# Patient Record
Sex: MALE | Race: BLACK OR AFRICAN AMERICAN | NOT HISPANIC OR LATINO | Employment: OTHER | RURAL
[De-identification: names, ages, dates, MRNs, and addresses within clinical notes are randomized per-mention and may not be internally consistent; named-entity substitution may affect disease eponyms.]

---

## 2022-03-21 ENCOUNTER — HOSPITAL ENCOUNTER (EMERGENCY)
Facility: HOSPITAL | Age: 37
Discharge: HOME | End: 2022-03-21
Attending: EMERGENCY MEDICINE
Payer: MEDICAID

## 2022-03-21 VITALS
OXYGEN SATURATION: 97 % | WEIGHT: 175 LBS | HEART RATE: 88 BPM | RESPIRATION RATE: 18 BRPM | HEIGHT: 76 IN | SYSTOLIC BLOOD PRESSURE: 129 MMHG | DIASTOLIC BLOOD PRESSURE: 93 MMHG | TEMPERATURE: 98 F | BODY MASS INDEX: 21.31 KG/M2

## 2022-03-21 DIAGNOSIS — T50.901A ACCIDENTAL DRUG OVERDOSE, INITIAL ENCOUNTER: Primary | ICD-10-CM

## 2022-03-21 PROCEDURE — 99282 EMERGENCY DEPT VISIT SF MDM: CPT | Mod: ,,, | Performed by: EMERGENCY MEDICINE

## 2022-03-21 PROCEDURE — 99283 EMERGENCY DEPT VISIT LOW MDM: CPT

## 2022-03-21 PROCEDURE — 99282 PR EMERGENCY DEPT VISIT,LEVEL II: ICD-10-PCS | Mod: ,,, | Performed by: EMERGENCY MEDICINE

## 2022-03-21 RX ORDER — SULFAMETHOXAZOLE AND TRIMETHOPRIM 800; 160 MG/1; MG/1
1 TABLET ORAL DAILY
COMMUNITY
Start: 2021-12-09

## 2022-03-21 RX ORDER — BICTEGRAVIR SODIUM, EMTRICITABINE, AND TENOFOVIR ALAFENAMIDE FUMARATE 50; 200; 25 MG/1; MG/1; MG/1
1 TABLET ORAL DAILY
COMMUNITY
Start: 2022-03-15

## 2022-03-21 NOTE — ED PROVIDER NOTES
Encounter Date: 3/21/2022    SCRIBE #1 NOTE: I, Elina Stevo, am scribing for, and in the presence of,  Darius Otoole MD. I have scribed the entire note.       History     Chief Complaint   Patient presents with    Drug Overdose     Patient is a 36 year old male who reports to the emergency department due to an accidentally drug overdose. Patient reports that he took his daily dose of Biktarvy 50/200/25 mg and 1 tab of Bactrim twice today (4 tabs from each bottle). Sister went to the patient's home and became concerned as the patient did not appear healthy. Sister is concerned the patient was attempting to commit suicide, patient denies this. Patient denies history of diabetes. Denies any fever or coughing. Patient reports being concerned and scared about the reactions that may occur.     The history is provided by the patient and a relative. No  was used.     Review of patient's allergies indicates:  No Known Allergies  Past Medical History:   Diagnosis Date    Human immunodeficiency virus (HIV) disease      History reviewed. No pertinent surgical history.  History reviewed. No pertinent family history.  Social History     Tobacco Use    Smoking status: Current Every Day Smoker     Types: Cigarettes    Smokeless tobacco: Never Used   Substance Use Topics    Alcohol use: Not Currently    Drug use: Yes     Types: Marijuana     Review of Systems   Constitutional: Negative.  Negative for fever.   HENT: Negative.    Eyes: Negative.    Respiratory: Negative.  Negative for cough.    Cardiovascular: Negative.    Gastrointestinal: Negative.    Endocrine: Negative.    Genitourinary: Negative.    Musculoskeletal: Negative.    Skin: Negative.    Allergic/Immunologic: Negative.    Neurological: Negative.    Hematological: Negative.    Psychiatric/Behavioral: The patient is nervous/anxious.    All other systems reviewed and are negative.      Physical Exam     Initial Vitals [03/21/22 1508]   BP  Pulse Resp Temp SpO2   122/83 95 18 97.8 °F (36.6 °C) 96 %      MAP       --         Physical Exam    Nursing note and vitals reviewed.  Constitutional: He appears well-developed and well-nourished.   HENT:   Head: Normocephalic and atraumatic.   Eyes: EOM are normal. Pupils are equal, round, and reactive to light.   Neck: Neck supple. No thyromegaly present.   Normal range of motion.  Cardiovascular: Normal rate, regular rhythm, normal heart sounds and intact distal pulses.   No murmur heard.  Pulmonary/Chest: Breath sounds normal. No respiratory distress. He has no wheezes.   Abdominal: Abdomen is soft. Bowel sounds are normal. He exhibits no distension. There is no abdominal tenderness.   Musculoskeletal:         General: No tenderness or edema. Normal range of motion.      Cervical back: Normal range of motion and neck supple.     Lymphadenopathy:     He has no cervical adenopathy.   Neurological: He is alert and oriented to person, place, and time. He has normal strength. No cranial nerve deficit or sensory deficit.   Skin: Skin is warm and dry. Capillary refill takes less than 2 seconds. No rash noted.   Psychiatric: His mood appears anxious.         ED Course   Procedures  Labs Reviewed - No data to display       Imaging Results    None          Medications - No data to display             Attending Attestation:           Physician Attestation for Scribe:  Physician Attestation Statement for Scribe #1: I, Xiang, reviewed documentation, as scribed by Stevo in my presence, and it is both accurate and complete.             ED Course as of 03/22/22 0619   Mon Mar 21, 2022   1616 Patient anxious and otherwise asymptomatic.  Accidentally took extra dose of his HIV medications and was very anxious and worried about that.  No other new symptoms.  Physical exam unremarkable.  Poison Control says that the dose was not toxic.  Family voiced understanding have patient return if symptoms worsen or new symptoms develop  [PK]      ED Course User Index  [PK] Darius Otoole MD             Clinical Impression:   Final diagnoses:  [T50.901A] Accidental drug overdose, initial encounter (Primary)          ED Disposition Condition    Discharge Stable        ED Prescriptions     None        Follow-up Information     Follow up With Specialties Details Why Contact [facility] - Emergency Department Emergency Medicine  If symptoms worsen [address] [place] [id number] [phone]           Darius Otoole MD  03/22/22 0619

## 2022-03-21 NOTE — ED TRIAGE NOTES
Pt states that he accidentally took extra home meds today. Denies trying to hurt himself. States that he forgets frequently if he has taken his medications or not & takes more. Pt states that he took a extra dose of his medications today. EMS states that family said that pt took 4 tabs out of each bottle. Home meds are Biktarvy 50/200/25 mg 1 tab daily & Bactrim DS 1 tab daily.

## 2024-12-23 ENCOUNTER — HOSPITAL ENCOUNTER (EMERGENCY)
Facility: HOSPITAL | Age: 39
Discharge: HOME | End: 2024-12-23
Payer: MEDICAID

## 2024-12-23 VITALS
BODY MASS INDEX: 19.12 KG/M2 | RESPIRATION RATE: 20 BRPM | HEIGHT: 76 IN | DIASTOLIC BLOOD PRESSURE: 83 MMHG | HEART RATE: 102 BPM | OXYGEN SATURATION: 99 % | TEMPERATURE: 97 F | WEIGHT: 157 LBS | SYSTOLIC BLOOD PRESSURE: 118 MMHG

## 2024-12-23 DIAGNOSIS — K59.00 CONSTIPATION, UNSPECIFIED CONSTIPATION TYPE: Primary | ICD-10-CM

## 2024-12-23 LAB
ALBUMIN SERPL BCP-MCNC: 4.1 G/DL (ref 3.5–5)
ALBUMIN/GLOB SERPL: 0.8 {RATIO}
ALP SERPL-CCNC: 79 U/L (ref 40–150)
ALT SERPL W P-5'-P-CCNC: 11 U/L
ANION GAP SERPL CALCULATED.3IONS-SCNC: 14 MMOL/L (ref 7–16)
AST SERPL W P-5'-P-CCNC: 39 U/L (ref 5–34)
BASOPHILS # BLD AUTO: 0.03 K/UL (ref 0–0.2)
BASOPHILS NFR BLD AUTO: 1.3 % (ref 0–1)
BILIRUB SERPL-MCNC: 0.5 MG/DL
BILIRUB UR QL STRIP: NEGATIVE
BUN SERPL-MCNC: 10 MG/DL (ref 9–21)
BUN/CREAT SERPL: 10 (ref 6–20)
CALCIUM SERPL-MCNC: 9.5 MG/DL (ref 8.4–10.2)
CHLORIDE SERPL-SCNC: 102 MMOL/L (ref 98–107)
CLARITY UR: CLEAR
CO2 SERPL-SCNC: 26 MMOL/L (ref 22–29)
COLOR UR: COLORLESS
CREAT SERPL-MCNC: 0.96 MG/DL (ref 0.72–1.25)
DIFFERENTIAL METHOD BLD: ABNORMAL
EGFR (NO RACE VARIABLE) (RUSH/TITUS): 102 ML/MIN/1.73M2
EOSINOPHIL # BLD AUTO: 0.16 K/UL (ref 0–0.5)
EOSINOPHIL NFR BLD AUTO: 7 % (ref 1–4)
EOSINOPHIL NFR BLD MANUAL: 7 % (ref 1–4)
ERYTHROCYTE [DISTWIDTH] IN BLOOD BY AUTOMATED COUNT: 11.7 % (ref 11.5–14.5)
GLOBULIN SER-MCNC: 5 G/DL (ref 2–4)
GLUCOSE SERPL-MCNC: 75 MG/DL (ref 74–100)
GLUCOSE UR STRIP-MCNC: NORMAL MG/DL
HCT VFR BLD AUTO: 44.3 % (ref 40–54)
HGB BLD-MCNC: 14.2 G/DL (ref 13.5–18)
IMM GRANULOCYTES # BLD AUTO: 0.01 K/UL (ref 0–0.04)
IMM GRANULOCYTES NFR BLD: 0.4 % (ref 0–0.4)
KETONES UR STRIP-SCNC: NEGATIVE MG/DL
LEUKOCYTE ESTERASE UR QL STRIP: NEGATIVE
LYMPHOCYTES # BLD AUTO: 0.62 K/UL (ref 1–4.8)
LYMPHOCYTES NFR BLD AUTO: 27.1 % (ref 27–41)
LYMPHOCYTES NFR BLD MANUAL: 26 % (ref 27–41)
MCH RBC QN AUTO: 30.7 PG (ref 27–31)
MCHC RBC AUTO-ENTMCNC: 32.1 G/DL (ref 32–36)
MCV RBC AUTO: 95.9 FL (ref 80–96)
MONOCYTES # BLD AUTO: 0.35 K/UL (ref 0–0.8)
MONOCYTES NFR BLD AUTO: 15.3 % (ref 2–6)
MONOCYTES NFR BLD MANUAL: 15 % (ref 2–6)
MPC BLD CALC-MCNC: 10.1 FL (ref 9.4–12.4)
NEUTROPHILS # BLD AUTO: 1.12 K/UL (ref 1.8–7.7)
NEUTROPHILS NFR BLD AUTO: 48.9 % (ref 53–65)
NEUTS BAND NFR BLD MANUAL: 2 % (ref 1–5)
NEUTS SEG NFR BLD MANUAL: 50 % (ref 50–62)
NITRITE UR QL STRIP: NEGATIVE
NRBC # BLD AUTO: 0 X10E3/UL
NRBC, AUTO (.00): 0 %
PH UR STRIP: 6 PH UNITS
PLATELET # BLD AUTO: 204 K/UL (ref 150–400)
PLATELET MORPHOLOGY: ABNORMAL
POTASSIUM SERPL-SCNC: 4.1 MMOL/L (ref 3.5–5.1)
PROT SERPL-MCNC: 9.1 G/DL (ref 6.4–8.3)
PROT UR QL STRIP: NEGATIVE
RBC # BLD AUTO: 4.62 M/UL (ref 4.6–6.2)
RBC # UR STRIP: NEGATIVE /UL
RBC MORPH BLD: NORMAL
SODIUM SERPL-SCNC: 138 MMOL/L (ref 136–145)
SP GR UR STRIP: 1
UROBILINOGEN UR STRIP-ACNC: NORMAL MG/DL
WBC # BLD AUTO: 2.29 K/UL (ref 4.5–11)

## 2024-12-23 PROCEDURE — 36415 COLL VENOUS BLD VENIPUNCTURE: CPT | Performed by: NURSE PRACTITIONER

## 2024-12-23 PROCEDURE — 99284 EMERGENCY DEPT VISIT MOD MDM: CPT | Mod: 25

## 2024-12-23 PROCEDURE — 85025 COMPLETE CBC W/AUTO DIFF WBC: CPT | Performed by: NURSE PRACTITIONER

## 2024-12-23 PROCEDURE — 80053 COMPREHEN METABOLIC PANEL: CPT | Performed by: NURSE PRACTITIONER

## 2024-12-23 PROCEDURE — 81003 URINALYSIS AUTO W/O SCOPE: CPT | Performed by: NURSE PRACTITIONER

## 2024-12-23 NOTE — DISCHARGE INSTRUCTIONS
Miralax 1 capful every day.  Drink plenty of water.  Get your nystatin (thrush medicine) filled & use as directed.  Follow-up with your primary care provider in a couple of days if no better.  Follow up with your HIV doctor as directed.  Return to the ER for new or worsening symptoms.

## 2025-07-22 ENCOUNTER — HOSPITAL ENCOUNTER (EMERGENCY)
Facility: HOSPITAL | Age: 40
Discharge: HOME | End: 2025-07-23
Attending: FAMILY MEDICINE
Payer: MEDICAID

## 2025-07-22 VITALS
HEART RATE: 72 BPM | RESPIRATION RATE: 16 BRPM | BODY MASS INDEX: 19.48 KG/M2 | HEIGHT: 76 IN | WEIGHT: 160 LBS | TEMPERATURE: 99 F | DIASTOLIC BLOOD PRESSURE: 75 MMHG | OXYGEN SATURATION: 100 % | SYSTOLIC BLOOD PRESSURE: 105 MMHG

## 2025-07-22 DIAGNOSIS — E86.0 DEHYDRATION: ICD-10-CM

## 2025-07-22 DIAGNOSIS — R56.9 SEIZURE: Primary | ICD-10-CM

## 2025-07-22 DIAGNOSIS — R62.7 FAILURE TO THRIVE IN ADULT: ICD-10-CM

## 2025-07-22 DIAGNOSIS — B37.0 OROPHARYNGEAL CANDIDIASIS: ICD-10-CM

## 2025-07-22 DIAGNOSIS — I63.81 LACUNAR INFARCTION: ICD-10-CM

## 2025-07-22 LAB
ALBUMIN SERPL BCP-MCNC: 3.4 G/DL (ref 3.5–5)
ALBUMIN/GLOB SERPL: 0.6 {RATIO}
ALP SERPL-CCNC: 62 U/L (ref 40–150)
ALT SERPL W P-5'-P-CCNC: 8 U/L
AMPHET UR QL SCN: NEGATIVE
AMYLASE SERPL-CCNC: 51 U/L (ref 25–125)
ANION GAP SERPL CALCULATED.3IONS-SCNC: 16 MMOL/L (ref 7–16)
AST SERPL W P-5'-P-CCNC: 39 U/L (ref 11–45)
BACTERIA #/AREA URNS HPF: ABNORMAL /HPF
BARBITURATES UR QL SCN: NEGATIVE
BASOPHILS # BLD AUTO: 0.01 K/UL (ref 0–0.2)
BASOPHILS NFR BLD AUTO: 0.3 % (ref 0–1)
BENZODIAZ METAB UR QL SCN: NEGATIVE
BILIRUB SERPL-MCNC: 1.3 MG/DL
BILIRUB UR QL STRIP: NEGATIVE
BUN SERPL-MCNC: 19 MG/DL (ref 9–21)
BUN/CREAT SERPL: 25 (ref 6–20)
CALCIUM SERPL-MCNC: 9.8 MG/DL (ref 8.4–10.2)
CANNABINOIDS UR QL SCN: POSITIVE
CHLORIDE SERPL-SCNC: 95 MMOL/L (ref 98–107)
CLARITY UR: CLEAR
CO2 SERPL-SCNC: 24 MMOL/L (ref 22–29)
COCAINE UR QL SCN: NEGATIVE
COLOR UR: YELLOW
CREAT SERPL-MCNC: 0.76 MG/DL (ref 0.72–1.25)
DIFFERENTIAL METHOD BLD: ABNORMAL
EGFR (NO RACE VARIABLE) (RUSH/TITUS): 115 ML/MIN/1.73M2
EOSINOPHIL # BLD AUTO: 0.01 K/UL (ref 0–0.5)
EOSINOPHIL NFR BLD AUTO: 0.3 % (ref 1–4)
ERYTHROCYTE [DISTWIDTH] IN BLOOD BY AUTOMATED COUNT: 15.2 % (ref 11.5–14.5)
GLOBULIN SER-MCNC: 5.4 G/DL (ref 2–4)
GLUCOSE SERPL-MCNC: 104 MG/DL (ref 74–100)
GLUCOSE UR STRIP-MCNC: NORMAL MG/DL
HCT VFR BLD AUTO: 38.2 % (ref 40–54)
HGB BLD-MCNC: 12.3 G/DL (ref 13.5–18)
IMM GRANULOCYTES # BLD AUTO: 0.05 K/UL (ref 0–0.04)
IMM GRANULOCYTES NFR BLD: 1.3 % (ref 0–0.4)
KETONES UR STRIP-SCNC: 40 MG/DL
LEUKOCYTE ESTERASE UR QL STRIP: NEGATIVE
LIPASE SERPL-CCNC: 22 U/L
LYMPHOCYTES # BLD AUTO: 0.26 K/UL (ref 1–4.8)
LYMPHOCYTES NFR BLD AUTO: 7 % (ref 27–41)
MCH RBC QN AUTO: 29.4 PG (ref 27–31)
MCHC RBC AUTO-ENTMCNC: 32.2 G/DL (ref 32–36)
MCV RBC AUTO: 91.4 FL (ref 80–96)
MONOCYTES # BLD AUTO: 0.3 K/UL (ref 0–0.8)
MONOCYTES NFR BLD AUTO: 8.1 % (ref 2–6)
MPC BLD CALC-MCNC: 8.6 FL (ref 9.4–12.4)
MUCOUS, UA: ABNORMAL /LPF
NEUTROPHILS # BLD AUTO: 3.09 K/UL (ref 1.8–7.7)
NEUTROPHILS NFR BLD AUTO: 83 % (ref 53–65)
NITRITE UR QL STRIP: NEGATIVE
NRBC # BLD AUTO: 0 X10E3/UL
NRBC, AUTO (.00): 0 %
OPIATES UR QL SCN: NEGATIVE
PCP UR QL SCN: NEGATIVE
PH UR STRIP: 6 PH UNITS
PLATELET # BLD AUTO: 298 K/UL (ref 150–400)
POTASSIUM SERPL-SCNC: 4.1 MMOL/L (ref 3.5–5.1)
PROT SERPL-MCNC: 8.8 G/DL (ref 6.4–8.3)
PROT UR QL STRIP: 30
RBC # BLD AUTO: 4.18 M/UL (ref 4.6–6.2)
RBC # UR STRIP: NEGATIVE /UL
RBC #/AREA URNS HPF: <1 /HPF
SODIUM SERPL-SCNC: 131 MMOL/L (ref 136–145)
SP GR UR STRIP: 1.03
UROBILINOGEN UR STRIP-ACNC: 8 MG/DL
WBC # BLD AUTO: 3.72 K/UL (ref 4.5–11)
WBC #/AREA URNS HPF: 1 /HPF

## 2025-07-22 PROCEDURE — 36415 COLL VENOUS BLD VENIPUNCTURE: CPT | Performed by: FAMILY MEDICINE

## 2025-07-22 PROCEDURE — 80053 COMPREHEN METABOLIC PANEL: CPT | Performed by: FAMILY MEDICINE

## 2025-07-22 PROCEDURE — 96360 HYDRATION IV INFUSION INIT: CPT

## 2025-07-22 PROCEDURE — 83690 ASSAY OF LIPASE: CPT | Performed by: FAMILY MEDICINE

## 2025-07-22 PROCEDURE — 82150 ASSAY OF AMYLASE: CPT | Performed by: FAMILY MEDICINE

## 2025-07-22 PROCEDURE — 63600175 PHARM REV CODE 636 W HCPCS: Performed by: EMERGENCY MEDICINE

## 2025-07-22 PROCEDURE — 85025 COMPLETE CBC W/AUTO DIFF WBC: CPT | Performed by: FAMILY MEDICINE

## 2025-07-22 PROCEDURE — 99285 EMERGENCY DEPT VISIT HI MDM: CPT | Mod: 25

## 2025-07-22 PROCEDURE — 80307 DRUG TEST PRSMV CHEM ANLYZR: CPT | Performed by: FAMILY MEDICINE

## 2025-07-22 PROCEDURE — 63700000 PHARM REV CODE 250 ALT 637 W/O HCPCS: Performed by: EMERGENCY MEDICINE

## 2025-07-22 PROCEDURE — 81001 URINALYSIS AUTO W/SCOPE: CPT | Performed by: FAMILY MEDICINE

## 2025-07-22 PROCEDURE — 25000003 PHARM REV CODE 250: Performed by: EMERGENCY MEDICINE

## 2025-07-22 RX ORDER — NYSTATIN 100000 [USP'U]/ML
500000 SUSPENSION ORAL
Qty: 200 ML | Refills: 0 | Status: SHIPPED | OUTPATIENT
Start: 2025-07-22 | End: 2025-08-01

## 2025-07-22 RX ORDER — NYSTATIN 100000 [USP'U]/ML
500000 SUSPENSION ORAL
Status: COMPLETED | OUTPATIENT
Start: 2025-07-22 | End: 2025-07-22

## 2025-07-22 RX ORDER — FLUCONAZOLE 100 MG/1
200 TABLET ORAL
Status: COMPLETED | OUTPATIENT
Start: 2025-07-22 | End: 2025-07-22

## 2025-07-22 RX ORDER — LACTOSE-REDUCED FOOD
30 LIQUID (ML) ORAL 2 TIMES DAILY
Qty: 300 ML | Refills: 0 | Status: SHIPPED | OUTPATIENT
Start: 2025-07-22 | End: 2025-07-25

## 2025-07-22 RX ADMIN — FLUCONAZOLE 200 MG: 100 TABLET ORAL at 08:07

## 2025-07-22 RX ADMIN — SODIUM CHLORIDE, POTASSIUM CHLORIDE, SODIUM LACTATE AND CALCIUM CHLORIDE 1000 ML: 600; 310; 30; 20 INJECTION, SOLUTION INTRAVENOUS at 08:07

## 2025-07-22 RX ADMIN — NYSTATIN 500000 UNITS: 100000 SUSPENSION ORAL at 08:07

## 2025-07-22 NOTE — ED PROVIDER NOTES
"Encounter Date: 7/22/2025    SCRIBE #1 NOTE: I, Zara Montesee, am scribing for, and in the presence of,  Milind Briggs DO.       History     Chief Complaint   Patient presents with    Fatigue     Pt reports being fatigued and reports he was taking a nap when he woke up and was not sure of where he was. He voiced not taking any of his medications "in a while." Pt is HIV +     This 40 y.o. male pt presents to the ED with c/o Fatigue. Pt is has Mhx of HIV and has not been able to eat. Pt states he has been wanting to eat but has not been able to eat. Pt is extremely skinny while here in the ED. There are no other issues to report with this pt at this time.     The history is provided by the patient. No  was used.     Review of patient's allergies indicates:  No Known Allergies  Past Medical History:   Diagnosis Date    Human immunodeficiency virus (HIV) disease      No past surgical history on file.  No family history on file.  Social History[1]  Review of Systems   Constitutional:  Positive for fatigue. Negative for activity change, chills, diaphoresis and fever.   HENT:  Negative for congestion, drooling, ear pain, rhinorrhea, sneezing, sore throat and trouble swallowing.    Eyes:  Negative for pain.   Respiratory:  Negative for cough, chest tightness, shortness of breath, wheezing and stridor.    Cardiovascular:  Negative for chest pain, palpitations and leg swelling.   Gastrointestinal:  Negative for abdominal distention, abdominal pain, constipation, diarrhea, nausea and vomiting.   Genitourinary:  Negative for difficulty urinating, dysuria, frequency and urgency.   Musculoskeletal:  Negative for arthralgias, back pain, myalgias, neck pain and neck stiffness.   Skin:  Negative for pallor, rash and wound.   Neurological:  Negative for dizziness, syncope, weakness, light-headedness, numbness and headaches.   All other systems reviewed and are negative.      Physical Exam     Initial Vitals " [07/22/25 1519]   BP Pulse Resp Temp SpO2   105/75 72 16 98.8 °F (37.1 °C) 100 %      MAP       --         Physical Exam    Constitutional: He appears lethargic.   Pt is extremely skinny and can see and count his ribs.      HENT:   Head: Normocephalic and atraumatic.   Right Ear: External ear normal.   Left Ear: External ear normal.   Nose: Nose normal. Mouth/Throat: Oropharynx is clear and moist.   Eyes: Conjunctivae and EOM are normal. Pupils are equal, round, and reactive to light.   Neck: Neck supple.   Normal range of motion.  Cardiovascular:  Normal rate, regular rhythm, normal heart sounds and intact distal pulses.           Pulmonary/Chest: Breath sounds normal.   Abdominal: Abdomen is soft. Bowel sounds are normal.   Genitourinary:    Prostate and penis normal.     Musculoskeletal:         General: Normal range of motion.      Cervical back: Normal range of motion and neck supple.     Neurological: He appears lethargic.   Skin: Skin is warm and dry.   Psychiatric: He has a normal mood and affect. His behavior is normal. Judgment and thought content normal.         ED Course   Procedures  Labs Reviewed   COMPREHENSIVE METABOLIC PANEL - Abnormal       Result Value    Sodium 131 (*)     Potassium 4.1      Chloride 95 (*)     CO2 24      Anion Gap 16      Glucose 104 (*)     BUN 19      Creatinine 0.76      BUN/Creatinine Ratio 25 (*)     Calcium 9.8      Total Protein 8.8 (*)     Albumin 3.4 (*)     Globulin 5.4 (*)     A/G Ratio 0.6      Bilirubin, Total 1.3      Alk Phos 62      ALT 8      AST 39      eGFR 115     URINALYSIS - Abnormal    Color, UA Yellow      Clarity, UA Clear      pH, UA 6.0      Leukocytes, UA Negative      Nitrites, UA Negative      Protein, UA 30 (*)     Glucose, UA Normal      Ketones, UA 40 (*)     Urobilinogen, UA 8 (*)     Bilirubin, UA Negative      Blood, UA Negative      Specific Gravity, UA 1.030     CBC WITH DIFFERENTIAL - Abnormal    WBC 3.72 (*)     RBC 4.18 (*)      Hemoglobin 12.3 (*)     Hematocrit 38.2 (*)     MCV 91.4      MCH 29.4      MCHC 32.2      RDW 15.2 (*)     Platelet Count 298      MPV 8.6 (*)     Neutrophils % 83.0 (*)     Lymphocytes % 7.0 (*)     Monocytes % 8.1 (*)     Eosinophils % 0.3 (*)     Basophils % 0.3      Immature Granulocytes % 1.3 (*)     nRBC, Auto 0.0      Neutrophils, Abs 3.09      Lymphocytes, Absolute 0.26 (*)     Monocytes, Absolute 0.30      Eosinophils, Absolute 0.01      Basophils, Absolute 0.01      Immature Granulocytes, Absolute 0.05 (*)     nRBC, Absolute 0.00      Diff Type Auto     DRUG SCREEN, URINE (BEAKER) - Abnormal    Barbiturates, Urine Negative      Benzodiazepine, Urine Negative      Opiates, Urine Negative      Phencyclidine, Urine Negative      Amphetamine, Urine Negative      Cannabinoid, Urine Positive (*)     Cocaine, Urine Negative      Narrative:     This screen includes the following classes of drugs at the listed cut-off:    Benzodiazepines 200 ng/ml  Cocaine metabolite 300 ng/ml  Opiates 2000 ng/ml  Barbiturates 200 ng/ml  Amphetamines 500 ng/ml  Marijuana metabs (THC) 50 ng/ml  Phencyclidine (PCP) 25 ng/ml    This is a screening test. If results do not correlate with clinical presentation, then a confirmatory send out test is advised.   URINALYSIS, MICROSCOPIC - Abnormal    WBC, UA 1      RBC, UA <1      Bacteria, UA Occasional (*)     Mucous Many (*)    AMYLASE - Normal    Amylase 51     LIPASE - Normal    Lipase 22     CBC W/ AUTO DIFFERENTIAL    Narrative:     The following orders were created for panel order CBC auto differential.  Procedure                               Abnormality         Status                     ---------                               -----------         ------                     CBC with Differential[[id number]]       Abnormal            Final result                 Please view results for these tests on the individual orders.          Imaging Results               CT Head Without  Contrast (Final result)  Result time 07/22/25 17:53:39      Final result by Rafael Willoughby MD (07/22/25 17:53:39)                   Impression:      Small hypodense area at the anterior margin of the left lateral basal ganglia could represent a small lacunar infarct age indeterminate, possibly recent. MRI follow-up may better characterize.    This report was flagged in Epic as abnormal.      Electronically signed by: Rafael Willoughby  Date:    07/22/2025  Time:    17:53               Narrative:    EXAMINATION:  CT HEAD WITHOUT CONTRAST    CLINICAL HISTORY:  Altered mental status, nontraumatic (Ped 0-18y);    TECHNIQUE:  Low dose axial CT images obtained throughout the head without intravenous contrast. Sagittal and coronal reconstructions were performed.    COMPARISON:  None.    FINDINGS:  Intracranial compartment:    Ventricles and sulci are normal in size for age without evidence of hydrocephalus. No extra-axial blood or fluid collections.    Small hypodense area at the anterior margin of the left lateral basal ganglia could represent a small lacunar infarct age indeterminate, possibly recent.  MRI follow-up may better characterize.    No parenchymal mass, hemorrhage, edema or major vascular distribution infarct.    Skull/extracranial contents (limited evaluation): No fracture. Mastoid air cells and paranasal sinuses are essentially clear.                                       X-Ray Chest PA And Lateral (Final result)  Result time 07/22/25 16:56:31      Final result by Ambar Tucker MD (07/22/25 16:56:31)                   Impression:      No acute abnormality.      Electronically signed by: Ambar Tucker MD  Date:    07/22/2025  Time:    16:56               Narrative:    EXAMINATION:  XR CHEST PA AND LATERAL    CLINICAL HISTORY:  Dehydration    TECHNIQUE:  PA and lateral views of the chest were performed.    COMPARISON:  None    FINDINGS:  The lungs are clear, with normal appearance of pulmonary  vasculature and no pleural effusion or pneumothorax.    The cardiac silhouette is normal in size. The hilar and mediastinal contours are unremarkable.    Bones are intact.                                       CT Abdomen Pelvis  Without Contrast (Final result)  Result time 07/22/25 16:42:34      Final result by Ambar Tucker MD (07/22/25 16:42:34)                   Impression:      There are no CT findings to specifically explain this patient's abdominal pain.  The study is limited by streak artifact from the patient's arms being positioned at their side.      Electronically signed by: Ambar Tucker MD  Date:    07/22/2025  Time:    16:42               Narrative:    EXAMINATION:  CT ABDOMEN PELVIS WITHOUT CONTRAST    CLINICAL HISTORY:  Abdominal pain, acute, nonlocalized;    TECHNIQUE:  Low dose axial images, sagittal and coronal reformations were obtained from the lung bases to the pubic symphysis.  30 mL of oral Omnipaque 350 was administered.    COMPARISON:  None    FINDINGS:  The liver, spleen, adrenal glands, kidneys and pancreas show an unremarkable non contrasted appearance.  The study is limited by streak artifact from the patient's arms being positioned at their side.  The gallbladder is in place.    There is no evidence bowel obstruction.  There is stool seen throughout the colon.  The appendix is not definitively seen however portions of the structure believed to represent the appendix within the right lower quadrant or within normal limits.    There is no evidence abdominal nor pelvic lymphadenopathy.  Reactive appearing lymph nodes are seen within both groins.  The osseous structures show likely bone islands within the left iliac bone..  There is no evidence abdominal fluid collection.                                       Medications   lactated ringers bolus 1,000 mL (0 mLs Intravenous Stopped 7/22/25 2200)   nystatin 100,000 unit/mL suspension 500,000 Units (500,000 Units Oral Given 7/22/25  2038)   fluconazole tablet 200 mg (200 mg Oral Given 7/22/25 2037)   levETIRAcetam tablet 1,000 mg (1,000 mg Oral Given 7/23/25 0018)     Medical Decision Making  Amount and/or Complexity of Data Reviewed  Labs: ordered.  Radiology: ordered.              Attending Attestation:           Physician Attestation for Scribe:  Physician Attestation Statement for Scribe #1: I, Milind Briggs, DO, reviewed documentation, as scribed by Zara Quezada in my presence, and it is both accurate and complete.             ED Course as of 07/23/25 1300   Wed Jul 23, 2025 0006 Patient had an episode of seizure activity prior to discharge.  I will give him a dose of Keppra start him on oral Keppra .  I will refer him to Neurology. [HK]      ED Course User Index  [HK] Zach Pink MD                Medical Decision Making:   Initial Assessment:   This 40 y.o. male pt presents to the ED with c/o Fatigue. Pt is has Mhx of HIV and has not been able to eat. Pt states he has been wanting to eat but has not been able to eat. Pt is extremely skinny while here in the ED. There are no other issues to report with this pt at this time.     The history is provided by the patient. No  was used.     Differential Diagnosis:   Seizure activity breakthrough seizure activity follow-up primary care                Clinical Impression:  Final diagnoses:  [E86.0] Dehydration  [B37.0] Oropharyngeal candidiasis  [I63.81] Lacunar infarction  [R62.7] Failure to thrive in adult  [R56.9] Seizure (Primary)          ED Disposition Condition    Discharge Stable          ED Prescriptions       Medication Sig Dispense Start Date End Date Auth. Provider    nystatin (MYCOSTATIN) 100,000 unit/mL suspension Take 5 mLs (500,000 Units total) by mouth 4 (four) times daily with meals and nightly. for 10 days 200 mL 7/22/2025 8/1/2025 Zach Pink MD    ENSURE ACTIVE LIGHT Liqd Take 30 mLs by mouth 2 (two) times daily. for 5 days 300 mL  7/22/2025 7/27/2025 Zach Pink MD    levETIRAcetam (KEPPRA) 500 MG Tab Take 1 tablet (500 mg total) by mouth 2 (two) times daily. 60 tablet 7/23/2025 7/23/2026 Zach Pink MD          Follow-up Information    None                [1]   Social History  Tobacco Use    Smoking status: Every Day     Types: Cigarettes    Smokeless tobacco: Never   Substance Use Topics    Alcohol use: Not Currently    Drug use: Yes     Types: Marijuana        Milind Briggs,   07/23/25 1300

## 2025-07-23 PROCEDURE — 25000003 PHARM REV CODE 250: Performed by: EMERGENCY MEDICINE

## 2025-07-23 RX ORDER — LEVETIRACETAM 500 MG/1
1000 TABLET ORAL ONCE
Status: COMPLETED | OUTPATIENT
Start: 2025-07-23 | End: 2025-07-23

## 2025-07-23 RX ORDER — LEVETIRACETAM 500 MG/1
500 TABLET ORAL 2 TIMES DAILY
Qty: 60 TABLET | Refills: 11 | Status: SHIPPED | OUTPATIENT
Start: 2025-07-23 | End: 2026-07-23

## 2025-07-23 RX ADMIN — LEVETIRACETAM 1000 MG: 500 TABLET, FILM COATED ORAL at 12:07

## 2025-07-23 NOTE — ED NOTES
Into room to d/c pt, pt not cooperative and unable to sit up. Dr. Pink called to room states it appears he had a seizure, pt back alert and oriented. MD orders placed and states since he is alert he is ok to be discharged.

## 2025-07-23 NOTE — ED NOTES
Pt d/c. Instructions reviewed with pt and family members. Denies any needs at this time. Pt ambulated to w/c and then to car with standby assist.

## 2025-07-25 ENCOUNTER — HOSPITAL ENCOUNTER (EMERGENCY)
Facility: HOSPITAL | Age: 40
Discharge: ANOTHER HOSPITAL | End: 2025-07-26
Attending: EMERGENCY MEDICINE
Payer: MEDICAID

## 2025-07-25 DIAGNOSIS — G40.909 SEIZURE DISORDER: ICD-10-CM

## 2025-07-25 DIAGNOSIS — B20 SYMPTOMATIC HIV INFECTION: ICD-10-CM

## 2025-07-25 DIAGNOSIS — R62.7 FAILURE TO THRIVE IN ADULT: ICD-10-CM

## 2025-07-25 DIAGNOSIS — E46 MALNUTRITION, UNSPECIFIED TYPE: ICD-10-CM

## 2025-07-25 DIAGNOSIS — R56.9 SEIZURE: Primary | ICD-10-CM

## 2025-07-25 DIAGNOSIS — H54.62 DECREASED VISION OF LEFT EYE: ICD-10-CM

## 2025-07-25 LAB
ALBUMIN SERPL BCP-MCNC: 3.2 G/DL (ref 3.5–5)
ALBUMIN/GLOB SERPL: 0.7 {RATIO}
ALP SERPL-CCNC: 64 U/L (ref 40–150)
ALT SERPL W P-5'-P-CCNC: 8 U/L
ANION GAP SERPL CALCULATED.3IONS-SCNC: 15 MMOL/L (ref 7–16)
AST SERPL W P-5'-P-CCNC: 13 U/L (ref 11–45)
BASOPHILS # BLD AUTO: 0.01 K/UL (ref 0–0.2)
BASOPHILS NFR BLD AUTO: 0.1 % (ref 0–1)
BILIRUB SERPL-MCNC: 0.9 MG/DL
BUN SERPL-MCNC: 26 MG/DL (ref 9–21)
BUN/CREAT SERPL: 28 (ref 6–20)
CALCIUM SERPL-MCNC: 8.7 MG/DL (ref 8.4–10.2)
CHLORIDE SERPL-SCNC: 96 MMOL/L (ref 98–107)
CO2 SERPL-SCNC: 26 MMOL/L (ref 22–29)
CREAT SERPL-MCNC: 0.94 MG/DL (ref 0.72–1.25)
DIFFERENTIAL METHOD BLD: ABNORMAL
EGFR (NO RACE VARIABLE) (RUSH/TITUS): 104 ML/MIN/1.73M2
EOSINOPHIL # BLD AUTO: 0.01 K/UL (ref 0–0.5)
EOSINOPHIL NFR BLD AUTO: 0.1 % (ref 1–4)
ERYTHROCYTE [DISTWIDTH] IN BLOOD BY AUTOMATED COUNT: 15.4 % (ref 11.5–14.5)
GLOBULIN SER-MCNC: 4.4 G/DL (ref 2–4)
GLUCOSE SERPL-MCNC: 101 MG/DL (ref 70–105)
GLUCOSE SERPL-MCNC: 114 MG/DL (ref 74–100)
HCT VFR BLD AUTO: 38 % (ref 40–54)
HGB BLD-MCNC: 12.3 G/DL (ref 13.5–18)
IMM GRANULOCYTES # BLD AUTO: 0.06 K/UL (ref 0–0.04)
IMM GRANULOCYTES NFR BLD: 0.9 % (ref 0–0.4)
LYMPHOCYTES # BLD AUTO: 0.22 K/UL (ref 1–4.8)
LYMPHOCYTES NFR BLD AUTO: 3.3 % (ref 27–41)
MAGNESIUM SERPL-MCNC: 2.4 MG/DL (ref 1.6–2.6)
MCH RBC QN AUTO: 29.6 PG (ref 27–31)
MCHC RBC AUTO-ENTMCNC: 32.4 G/DL (ref 32–36)
MCV RBC AUTO: 91.6 FL (ref 80–96)
MONOCYTES # BLD AUTO: 0.29 K/UL (ref 0–0.8)
MONOCYTES NFR BLD AUTO: 4.3 % (ref 2–6)
MPC BLD CALC-MCNC: 8.5 FL (ref 9.4–12.4)
NEUTROPHILS # BLD AUTO: 6.15 K/UL (ref 1.8–7.7)
NEUTROPHILS NFR BLD AUTO: 91.3 % (ref 53–65)
NRBC # BLD AUTO: 0 X10E3/UL
NRBC, AUTO (.00): 0 %
PLATELET # BLD AUTO: 270 K/UL (ref 150–400)
POTASSIUM SERPL-SCNC: 3.7 MMOL/L (ref 3.5–5.1)
PREALB SERPL NEPH-MCNC: 18 MG/DL (ref 18–45)
PROLACTIN SERPL-MCNC: 27.17 NG/ML (ref 3.46–19.4)
PROT SERPL-MCNC: 7.6 G/DL (ref 6.4–8.3)
RBC # BLD AUTO: 4.15 M/UL (ref 4.6–6.2)
SARS-COV-2 RDRP RESP QL NAA+PROBE: NEGATIVE
SODIUM SERPL-SCNC: 133 MMOL/L (ref 136–145)
WBC # BLD AUTO: 6.74 K/UL (ref 4.5–11)

## 2025-07-25 PROCEDURE — 87635 SARS-COV-2 COVID-19 AMP PRB: CPT | Performed by: EMERGENCY MEDICINE

## 2025-07-25 PROCEDURE — 63600175 PHARM REV CODE 636 W HCPCS: Performed by: EMERGENCY MEDICINE

## 2025-07-25 PROCEDURE — 84134 ASSAY OF PREALBUMIN: CPT | Performed by: EMERGENCY MEDICINE

## 2025-07-25 PROCEDURE — 36415 COLL VENOUS BLD VENIPUNCTURE: CPT | Performed by: EMERGENCY MEDICINE

## 2025-07-25 PROCEDURE — 85025 COMPLETE CBC W/AUTO DIFF WBC: CPT | Performed by: EMERGENCY MEDICINE

## 2025-07-25 PROCEDURE — 96374 THER/PROPH/DIAG INJ IV PUSH: CPT

## 2025-07-25 PROCEDURE — 99285 EMERGENCY DEPT VISIT HI MDM: CPT | Mod: 25

## 2025-07-25 PROCEDURE — 80053 COMPREHEN METABOLIC PANEL: CPT | Performed by: EMERGENCY MEDICINE

## 2025-07-25 PROCEDURE — 82962 GLUCOSE BLOOD TEST: CPT

## 2025-07-25 PROCEDURE — 84146 ASSAY OF PROLACTIN: CPT | Performed by: EMERGENCY MEDICINE

## 2025-07-25 PROCEDURE — 96361 HYDRATE IV INFUSION ADD-ON: CPT

## 2025-07-25 PROCEDURE — 83735 ASSAY OF MAGNESIUM: CPT | Performed by: EMERGENCY MEDICINE

## 2025-07-25 RX ORDER — LEVETIRACETAM 500 MG/5ML
1500 INJECTION, SOLUTION, CONCENTRATE INTRAVENOUS EVERY 12 HOURS
Status: DISCONTINUED | OUTPATIENT
Start: 2025-07-25 | End: 2025-07-26 | Stop reason: HOSPADM

## 2025-07-25 RX ORDER — LACTOSE-REDUCED FOOD
30 LIQUID (ML) ORAL 2 TIMES DAILY
Qty: 300 ML | Refills: 0 | Status: SHIPPED | OUTPATIENT
Start: 2025-07-25 | End: 2025-07-30

## 2025-07-25 RX ADMIN — SODIUM CHLORIDE, POTASSIUM CHLORIDE, SODIUM LACTATE AND CALCIUM CHLORIDE 1000 ML: 600; 310; 30; 20 INJECTION, SOLUTION INTRAVENOUS at 11:07

## 2025-07-25 RX ADMIN — SODIUM CHLORIDE, POTASSIUM CHLORIDE, SODIUM LACTATE AND CALCIUM CHLORIDE 1000 ML: 600; 310; 30; 20 INJECTION, SOLUTION INTRAVENOUS at 07:07

## 2025-07-25 RX ADMIN — LEVETIRACETAM 1500 MG: 100 INJECTION, SOLUTION INTRAVENOUS at 07:07

## 2025-07-25 NOTE — ED PROVIDER NOTES
Encounter Date: 7/25/2025    SCRIBE #1 NOTE: I, Dorcas Pringle, am scribing for, and in the presence of,  Zach Pink MD. I have scribed the entire note.       History     Chief Complaint   Patient presents with    Seizures    Fatigue    Generalized Body Aches     Pt presents to the ED with c/o seizure at 1200 today, niece reports pt's seizure lasting for 2 minutes. Pt also has fatigue for the past week and not wanting to anything, weakness for the past two days and weakness.     This is a 39 y/o male,who presents to the ED for further evaluation. His niece is in the room and tries to help with Hx, but is a poor historian. His niece states the pt is not getting any better. He has a known hx of HIV. According to previous records, the pt was at one point not taking his HIV medications. His niece is unsure if the pt has an HIV doctor. There is no hx of the pt being short of breath. There is no hx of a fever, but he has had a decreased PO intake. His niece stated to nursing staff, the pt had seizure like activity while at home which lasted for 2 minutes. There are no other complaints/pain in the ED at this time. He is a current every day smoker.     The history is provided by the patient and a relative.     Review of patient's allergies indicates:  No Known Allergies  Past Medical History:   Diagnosis Date    Human immunodeficiency virus (HIV) disease      History reviewed. No pertinent surgical history.  No family history on file.  Social History[1]  Review of Systems   Constitutional:  Positive for appetite change (Decreased PO intake.). Negative for fever.   Respiratory:  Negative for shortness of breath.    Neurological:  Positive for seizures.   All other systems reviewed and are negative.      Physical Exam     Initial Vitals [07/25/25 1812]   BP Pulse Resp Temp SpO2   103/67 (!) 111 20 98 °F (36.7 °C) 95 %      MAP       --         Physical Exam    Nursing note and vitals reviewed.  Constitutional:    Pt appears to be Cachectic   HENT:   Head: Normocephalic and atraumatic.   Eyes: EOM are normal. Pupils are equal, round, and reactive to light.   Neck: Neck supple. No thyromegaly present.   Normal range of motion.  Cardiovascular:  Regular rhythm, normal heart sounds and intact distal pulses.           No murmur heard.  Pt is tachycardiac.      Pulmonary/Chest: No respiratory distress. He has no wheezes.   Hard to say since the pt is not taking deep breaths.      Abdominal: Abdomen is soft. Bowel sounds are normal. There is no abdominal tenderness.   Musculoskeletal:         General: No tenderness or edema. Normal range of motion.      Cervical back: Normal range of motion and neck supple.     Lymphadenopathy:     He has no cervical adenopathy.   Neurological: He is alert and oriented to person, place, and time. He has normal strength and normal reflexes. No cranial nerve deficit or sensory deficit. GCS score is 15. GCS eye subscore is 4. GCS verbal subscore is 5. GCS motor subscore is 6.   Skin: Skin is warm and dry. Capillary refill takes less than 2 seconds. No rash noted.   Psychiatric: He has a normal mood and affect.         ED Course   Procedures  Labs Reviewed   COMPREHENSIVE METABOLIC PANEL - Abnormal       Result Value    Sodium 133 (*)     Potassium 3.7      Chloride 96 (*)     CO2 26      Anion Gap 15      Glucose 114 (*)     BUN 26 (*)     Creatinine 0.94      BUN/Creatinine Ratio 28 (*)     Calcium 8.7      Total Protein 7.6      Albumin 3.2 (*)     Globulin 4.4 (*)     A/G Ratio 0.7      Bilirubin, Total 0.9      Alk Phos 64      ALT 8      AST 13      eGFR 104     PROLACTIN - Abnormal    Prolactin, Blood 27.17 (*)    DRUG SCREEN, URINE (BEAKER) - Abnormal    Barbiturates, Urine Negative      Benzodiazepine, Urine Negative      Opiates, Urine Negative      Phencyclidine, Urine Negative      Amphetamine, Urine Negative      Cannabinoid, Urine Positive (*)     Cocaine, Urine Negative      Narrative:      This screen includes the following classes of drugs at the listed cut-off:    Benzodiazepines 200 ng/ml  Cocaine metabolite 300 ng/ml  Opiates 2000 ng/ml  Barbiturates 200 ng/ml  Amphetamines 500 ng/ml  Marijuana metabs (THC) 50 ng/ml  Phencyclidine (PCP) 25 ng/ml    This is a screening test. If results do not correlate with clinical presentation, then a confirmatory send out test is advised.   CBC WITH DIFFERENTIAL - Abnormal    WBC 6.74      RBC 4.15 (*)     Hemoglobin 12.3 (*)     Hematocrit 38.0 (*)     MCV 91.6      MCH 29.6      MCHC 32.4      RDW 15.4 (*)     Platelet Count 270      MPV 8.5 (*)     Neutrophils % 91.3 (*)     Lymphocytes % 3.3 (*)     Monocytes % 4.3      Eosinophils % 0.1 (*)     Basophils % 0.1      Immature Granulocytes % 0.9 (*)     nRBC, Auto 0.0      Neutrophils, Abs 6.15      Lymphocytes, Absolute 0.22 (*)     Monocytes, Absolute 0.29      Eosinophils, Absolute 0.01      Basophils, Absolute 0.01      Immature Granulocytes, Absolute 0.06 (*)     nRBC, Absolute 0.00      Diff Type Auto     URINALYSIS, REFLEX TO URINE CULTURE - Abnormal    Color, UA Yellow      Clarity, UA Clear      pH, UA 6.0      Leukocytes, UA Negative      Nitrites, UA Negative      Protein, UA 20 (*)     Glucose, UA Normal      Ketones, UA Negative      Urobilinogen, UA 6 (*)     Bilirubin, UA Negative      Blood, UA Negative      Specific Gravity, UA 1.027     MAGNESIUM - Normal    Magnesium 2.4     PREALBUMIN - Normal    Prealbumin 18     SARS-COV-2 RNA AMPLIFICATION, QUAL - Normal    SARS COV-2 Molecular Negative      Narrative:     Negative SARS-CoV results should not be used as the sole basis for treatment or patient management decisions; negative results should be considered in the context of a patient's recent exposures, history and the presene of clinical signs and symptoms consistent with COVID-19.  Negative results should be treated as presumptive and confirmed by molecular assay, if necessary for  patient management.   CBC W/ AUTO DIFFERENTIAL    Narrative:     The following orders were created for panel order CBC auto differential.  Procedure                               Abnormality         Status                     ---------                               -----------         ------                     CBC with Differential[[id number]]       Abnormal            Final result                 Please view results for these tests on the individual orders.   POCT GLUCOSE MONITORING CONTINUOUS    POC Glucose 101       EKG Readings: (Independently Interpreted)   Heart Rate: 99.   Interpreted by Dr. Joesph MD:   Sinus rhythm with aberrantly conducted supraventricular complexes  Q in V1,V2 may be due to lead placement error through septal infarct cannot be excluded.   Lateral T wave abnormality may be due to myocardial ischemia         Imaging Results              X-Ray Chest AP Portable (Final result)  Result time 07/25/25 19:35:30      Final result by Rafael Willoughby MD (07/25/25 19:35:30)                   Impression:      No acute abnormality.      Electronically signed by: Rafael Willoughby  Date:    07/25/2025  Time:    19:35               Narrative:    EXAMINATION:  XR CHEST AP PORTABLE    CLINICAL HISTORY:  Chest Pain;    TECHNIQUE:  Single frontal view of the chest was performed.    COMPARISON:  07/22/2025    FINDINGS:  The lungs are clear, with normal appearance of pulmonary vasculature and no pleural effusion or pneumothorax.    The cardiac silhouette is normal in size. The hilar and mediastinal contours are unremarkable.    Bones are intact.                                       Medications   lactated ringers bolus 1,000 mL (0 mLs Intravenous Stopped 7/25/25 2016)   lactated ringers bolus 1,000 mL (0 mLs Intravenous Stopped 7/26/25 0121)   lactated ringers bolus 1,000 mL (0 mLs Intravenous Stopped 7/26/25 0049)   sodium chloride 0.9% bolus 1,000 mL 1,000 mL (0 mLs Intravenous Stopped 7/26/25 1315)      Medical Decision Making  This is a 41 y/o male,who presents to the ED for further evaluation. His niece is in the room and tries to help with Hx, but is a poor historian. His niece states the pt is not getting any better. He has a known hx of HIV. According to previous records, the pt was at one point not taking his HIV medications. His niece is unsure if the pt has an HIV doctor. There is no hx of the pt being short of breath. There is no hx of a fever, but he has had a decreased PO intake. His niece stated to nursing staff, the pt had seizure like activity while at home which lasted for 2 minutes. There are no other complaints/pain in the ED at this time. He is a current every day smoker.   Ddx: new onset seizure +/- failure to thrive vs aids vs other  Transfer to higher level of care      Amount and/or Complexity of Data Reviewed  Independent Historian:      Details: We spoke with the pt and his niece.     Labs: ordered.  Radiology: ordered.  Discussion of management or test interpretation with external provider(s): Discussed with ED attending at [facility], will accept    Risk  OTC drugs.  Prescription drug management.  Decision regarding hospitalization.              Attending Attestation:           Physician Attestation for Scribe:  Physician Attestation Statement for Scribe #1: I, Zach Pink MD, reviewed documentation, as scribed by Dorcas Pringle in my presence, and it is both accurate and complete.             ED Course as of 08/02/25 1348   Fri Jul 25, 2025 2010 Xray Chest AP Portable:   No acute abnormality. [BW]   Sat Jul 26, 2025   0608 Care transferred to Dr. Obrien [HK]      ED Course User Index  [BW] Dorcas Pringle  [HK] Zach Pink MD                               Clinical Impression:  Final diagnoses:  [R56.9] Seizure (Primary)  [E46] Malnutrition, unspecified type          ED Disposition Condition    Transfer to Another Facility Stable                      [1]    Social History  Tobacco Use    Smoking status: Every Day     Types: Cigarettes    Smokeless tobacco: Never   Substance Use Topics    Alcohol use: Not Currently    Drug use: Yes     Types: Marijuana        Kostas Obrien MD  08/02/25 7129

## 2025-07-26 VITALS
SYSTOLIC BLOOD PRESSURE: 113 MMHG | OXYGEN SATURATION: 96 % | DIASTOLIC BLOOD PRESSURE: 80 MMHG | RESPIRATION RATE: 18 BRPM | TEMPERATURE: 98 F | HEART RATE: 66 BPM | BODY MASS INDEX: 21.31 KG/M2 | HEIGHT: 76 IN | WEIGHT: 175 LBS

## 2025-07-26 PROBLEM — Z21 HIV (HUMAN IMMUNODEFICIENCY VIRUS INFECTION): Status: ACTIVE | Noted: 2025-07-26

## 2025-07-26 PROBLEM — F12.20 MARIJUANA DEPENDENCE: Status: ACTIVE | Noted: 2025-07-26

## 2025-07-26 PROBLEM — R62.7 FAILURE TO THRIVE IN ADULT: Status: ACTIVE | Noted: 2025-07-26

## 2025-07-26 PROBLEM — Z91.199 NONCOMPLIANCE: Status: ACTIVE | Noted: 2025-07-26

## 2025-07-26 PROBLEM — F29 UNSPECIFIED PSYCHOSIS NOT DUE TO A SUBSTANCE OR KNOWN PHYSIOLOGICAL CONDITION: Status: ACTIVE | Noted: 2025-07-26

## 2025-07-26 PROBLEM — H54.62 DECREASED VISION OF LEFT EYE: Status: ACTIVE | Noted: 2025-07-26

## 2025-07-26 PROBLEM — G40.909 SEIZURE DISORDER: Status: ACTIVE | Noted: 2025-07-26

## 2025-07-26 LAB
AMPHET UR QL SCN: NEGATIVE
BARBITURATES UR QL SCN: NEGATIVE
BENZODIAZ METAB UR QL SCN: NEGATIVE
BILIRUB UR QL STRIP: NEGATIVE
CANNABINOIDS UR QL SCN: POSITIVE
CLARITY UR: CLEAR
COCAINE UR QL SCN: NEGATIVE
COLOR UR: YELLOW
GLUCOSE UR STRIP-MCNC: NORMAL MG/DL
KETONES UR STRIP-SCNC: NEGATIVE MG/DL
LEUKOCYTE ESTERASE UR QL STRIP: NEGATIVE
NITRITE UR QL STRIP: NEGATIVE
OPIATES UR QL SCN: NEGATIVE
PCP UR QL SCN: NEGATIVE
PH UR STRIP: 6 PH UNITS
PROT UR QL STRIP: 20
RBC # UR STRIP: NEGATIVE /UL
SP GR UR STRIP: 1.03
UROBILINOGEN UR STRIP-ACNC: 6 MG/DL

## 2025-07-26 PROCEDURE — 80307 DRUG TEST PRSMV CHEM ANLYZR: CPT | Performed by: EMERGENCY MEDICINE

## 2025-07-26 PROCEDURE — 96361 HYDRATE IV INFUSION ADD-ON: CPT

## 2025-07-26 PROCEDURE — 63600175 PHARM REV CODE 636 W HCPCS: Performed by: EMERGENCY MEDICINE

## 2025-07-26 PROCEDURE — 25000003 PHARM REV CODE 250: Mod: UD | Performed by: EMERGENCY MEDICINE

## 2025-07-26 PROCEDURE — 81003 URINALYSIS AUTO W/O SCOPE: CPT | Performed by: EMERGENCY MEDICINE

## 2025-07-26 PROCEDURE — 96374 THER/PROPH/DIAG INJ IV PUSH: CPT | Mod: 59

## 2025-07-26 PROCEDURE — 99205 OFFICE O/P NEW HI 60 MIN: CPT | Mod: ,,, | Performed by: HOSPITALIST

## 2025-07-26 RX ADMIN — SODIUM CHLORIDE, POTASSIUM CHLORIDE, SODIUM LACTATE AND CALCIUM CHLORIDE 1000 ML: 600; 310; 30; 20 INJECTION, SOLUTION INTRAVENOUS at 12:07

## 2025-07-26 RX ADMIN — SODIUM CHLORIDE 1000 ML: 9 INJECTION, SOLUTION INTRAVENOUS at 12:07

## 2025-07-26 RX ADMIN — LEVETIRACETAM 1500 MG: 100 INJECTION, SOLUTION INTRAVENOUS at 10:07

## 2025-07-26 NOTE — ASSESSMENT & PLAN NOTE
Smokes marijuana but states it has not used in over 2 weeks  Says he does this to help with appetite.  Denies any nausea and vomiting but told him that marijuana could be working in the opposite on him.

## 2025-07-26 NOTE — ED NOTES
"Cathed pt for urine sample and had no urine return. Pt does not recall events that happened around discharge. States "I fell asleep."   "

## 2025-07-26 NOTE — ASSESSMENT & PLAN NOTE
"  States last hallucinations was a proximally 3 weeks earlier.  Describes them as seeing.  States a lot of people he sees or people that he knew that have since .  States sometimes they talk to him.  When asked if they were scary he states no they are "nice".  When seen at [facility] in  he was then noted to have suicidal thoughts.  States he is no longer suicidal and has no plans or thoughts of harming himself.  States he has safe in this regard.   "

## 2025-07-26 NOTE — ED NOTES
Attempted to discharge pt after pt made several statements regarding wanting to go home and asking how much longer. Family member at  laughing at everything pt says and does. Pt unable to stand on his own to transfer to wheelchair and then car. Pt not even holding head up. Pt out of wheelchair to car and slid down to ground and would not even attempt to get into car. Family states to take him back inside that they will just bring him right back up if he goes home. Pt back to room and required 3 nurses to transfer back to bed. Joesph [employer] made aware.

## 2025-07-26 NOTE — CONSULTS
"[facility] - Emergency Department  Hospital Medicine  Consult Note    Patient Name: Gabino Mccall  MRN: [id number]  Admission Date: 7/25/2025  Hospital Length of Stay: 0 days  Attending Physician: Kostas Obrien MD   Primary Care Provider: Dianne, Primary Doctor           Patient information was obtained from patient, past medical records, and ER records.     Consults  Subjective:     Principal Problem: <principal problem not specified>    Chief Complaint:   Chief Complaint   Patient presents with    Seizures    Fatigue    Generalized Body Aches     Pt presents to the ED with c/o seizure at 1200 today, niece reports pt's seizure lasting for 2 minutes. Pt also has fatigue for the past week and not wanting to anything, weakness for the past two days and weakness.        HPI:   Thank you for consult    Chief complaint:  Can not eat    History of present:        Mr. Mccall is a 40-year-old admitted prior day to the emergency room after having witnessed seizure at home lasting approximately 2 minutes.  Patient states he must have passed out and does not remember.  States he has had several falls reasons in told that they were seizure.  He is not aware of circumstances in that he states he just "falls out".  States he has had urinary incontinence with these episodes but no tongue biting.  Gets occasionally headaches but not severe and not bothering him recently.  No fever or other recent illness.  He had been in the emergency department on 07/22 after his family found him unresponsive and had hard time waking him up.  He had told providers at that time he must have taken a nap.  Patient does not remember details of that episode currently.  Patient has been taking Keppra.  He does not remember when that was started or circumstance.  Feels these episodes of falling out that he has been told were seizure started this year.  Has not seen anyone outside the emergency department concerning these.  Patient was being " "evaluated here with decision to discharge him home with outpatient follow up.  Going to the car had a near syncopal episode and was brought back into the emergency department.  He states that episode was somewhat different although he can not remember the whole episode.  Nursing staff did not witness any seizure with that episode nor any postictal period.       Patient  has had several months of "not been able to eat".  Was treated in December 2024 4 thrush and given nystatin.  This time denies any sore mouth or dysphagia.  That is just does not have any appetite.  He feels he has lost weight but not sure how much.  Tells me he weighed 175 lb 3 months ago in that is his current listed weigth ( will have confirmed).  No abdominal pain.  No GI upset.  Last bowel movement prior day and but he states he is not eating enough to have bowel movements.       Patient has history of some point over 3 years of HIV.  He had been followed at [facility].  States proximally year ago he stopped all those medicines and quit gone.  There has numerous listed call in records of appointments made and patient not showing up.   is currently taking 2 home medicines.  Only thing confirmed was nystatin swish and swallow and Keppra 500 mg twice daily.  He has been off Septra.  Had listed Biktarvy  -25 daily but could not be confirm that he had had filled plus patient told me he stopped all his HIV drugs more than a year ago.         Hospitalist service was asked to evaluate patient medically.    Review of systems:  See above.  Patient's poor historian and difficult to get specifics from him.  Has some thick speech that is also difficult to understand.  Occasional headaches but not recent.  States in the last week has had Mik decreased vision to left eye.  No diplopia.  No prior similar problems.  Last bowel movement prior day that was small and hard.  States urination has been good.  As stated denies sore mouth or " dysphagia that would suggest active yeast infection.  States can ambulate just weak.  History of hallucinations.  States last hallucinations proximally 3 weeks earlier.  Describes them has visual hallucinations generally but can hear people talking at times.  States has been people that he knows had  that he can see.  States it is not scary.  Denies any suicidal thoughts or plans currently.  Review of systems otherwise negative.    Past medical history:  -HIV diagnosed more than 3 years ago  -unspecified psychosis  -seizure disorder    Past surgical history  None    No known medication allergies    Social history:  Lives with sister  States it has not been sexually active in more than 1 year  Had some homosexual exposure he states 5 or 6 years ago  No history of tobacco or alcohol use  States only uses marijuana because he feels it help him with his appetite  Last marijuana he states he has been over 2 weeks prior to admission    Family history:  No one in family premature coronary artery disease    Health maintenance issues:  No primary care provider, states it has not seen doctor in over a year accept through the emergency department          Past Medical History:   Diagnosis Date    Human immunodeficiency virus (HIV) disease        History reviewed. No pertinent surgical history.    Review of patient's allergies indicates:  No Known Allergies    No current facility-administered medications on file prior to encounter.     Current Outpatient Medications on File Prior to Encounter   Medication Sig    levETIRAcetam (KEPPRA) 500 MG Tab Take 1 tablet (500 mg total) by mouth 2 (two) times daily.    nystatin (MYCOSTATIN) 100,000 unit/mL suspension Take 5 mLs (500,000 Units total) by mouth 4 (four) times daily with meals and nightly. for 10 days    BIKTARVY -25 mg (25 kg or greater) Take 1 tablet by mouth once daily.    [DISCONTINUED] sulfamethoxazole-trimethoprim 800-160mg (BACTRIM DS) 800-160 mg Tab Take 1  tablet by mouth once daily.     Family History    None       Tobacco Use    Smoking status: Every Day     Types: Cigarettes    Smokeless tobacco: Never   Substance and Sexual Activity    Alcohol use: Not Currently    Drug use: Yes     Types: Marijuana    Sexual activity: Not on file     Review of Systems   Constitutional:  Positive for activity change, appetite change and fatigue. Negative for fever.   HENT:  Negative for congestion, hearing loss and trouble swallowing.    Eyes:  Positive for visual disturbance.   Respiratory:  Positive for cough. Negative for chest tightness, shortness of breath and wheezing.    Cardiovascular:  Negative for chest pain and palpitations.   Gastrointestinal:  Positive for constipation. Negative for abdominal pain and nausea.   Genitourinary:  Negative for difficulty urinating and dysuria.   Musculoskeletal:  Negative for back pain and neck stiffness.   Skin:  Negative for pallor and rash.   Neurological:  Negative for dizziness, speech difficulty and headaches.   Psychiatric/Behavioral:  Positive for hallucinations. Negative for confusion and suicidal ideas.      Objective:     Vital Signs (Most Recent):  Temp: 97.6 °F (36.4 °C) (07/26/25 0705)  Pulse: 66 (07/26/25 1645)  Resp: 18 (07/26/25 0705)  BP: (!) 136/99 (07/26/25 1645)  SpO2: 96 % (07/26/25 1645) Vital Signs (24h Range):  Temp:  [97.6 °F (36.4 °C)-98 °F (36.7 °C)] 97.6 °F (36.4 °C)  Pulse:  [] 66  Resp:  [15-20] 18  SpO2:  [91 %-100 %] 96 %  BP: ()/(64-99) 136/99     Weight: 79.4 kg (175 lb)  Body mass index is 21.3 kg/m².     Physical Exam  Vitals reviewed.   Constitutional:       General: He is awake. He is not in acute distress.     Appearance: He is well-developed. He is not toxic-appearing.      Comments: Very thin   HENT:      Head: Normocephalic.      Nose: Nose normal.      Mouth/Throat:      Mouth: No oral lesions.      Pharynx: Oropharynx is clear.      Comments: No clear thrush  Eyes:      Extraocular  Movements: Extraocular movements intact.      Right eye: Normal extraocular motion.      Left eye: Normal extraocular motion.      Pupils: Pupils are equal, round, and reactive to light.      Comments: Pupils 2 mm bilaterally and reactive, pupil seem slightly constricted bilaterally    Decreased vision in left eye without field cut, just states blurry vision in left eye but can counts fingers with that eye   Neck:      Thyroid: No thyroid mass.      Vascular: No carotid bruit.   Cardiovascular:      Rate and Rhythm: Normal rate and regular rhythm.      Pulses: Normal pulses.      Heart sounds: Normal heart sounds. No murmur heard.  Pulmonary:      Effort: Pulmonary effort is normal.      Breath sounds: Normal breath sounds and air entry. No wheezing.   Abdominal:      General: Bowel sounds are normal. There is no distension.      Palpations: Abdomen is soft.      Tenderness: There is no abdominal tenderness.   Musculoskeletal:         General: Normal range of motion.      Cervical back: Neck supple. No rigidity.   Skin:     General: Skin is warm.      Coloration: Skin is not jaundiced.      Findings: No lesion.   Neurological:      General: No focal deficit present.      Mental Status: He is alert and oriented to person, place, and time.      Cranial Nerves: No cranial nerve deficit.   Psychiatric:         Attention and Perception: Attention normal.         Mood and Affect: Mood normal.         Behavior: Behavior normal. Behavior is cooperative.         Thought Content: Thought content normal.         Cognition and Memory: Cognition normal.          Significant Labs: All pertinent labs within the past 24 hours have been reviewed.  BMP:   Recent Labs   Lab 07/25/25 1922   *   *   K 3.7   CL 96*   CO2 26   BUN 26*   CREATININE 0.94   CALCIUM 8.7   MG 2.4     CBC:   Recent Labs   Lab 07/25/25 1922   WBC 6.74   HGB 12.3*   HCT 38.0*        CMP:   Recent Labs   Lab 07/25/25 1922   *   K 3.7    CL 96*   CO2 26   *   BUN 26*   CREATININE 0.94   CALCIUM 8.7   PROT 7.6   ALBUMIN 3.2*   BILITOT 0.9   ALKPHOS 64   AST 13   ALT 8   ANIONGAP 15       Imaging Results              X-Ray Chest AP Portable (Final result)  Result time 07/25/25 19:35:30      Final result by Rafael Willoughby MD (07/25/25 19:35:30)                   Impression:      No acute abnormality.      Electronically signed by: Rafael Willoughby  Date:    07/25/2025  Time:    19:35               Narrative:    EXAMINATION:  XR CHEST AP PORTABLE    CLINICAL HISTORY:  Chest Pain;    TECHNIQUE:  Single frontal view of the chest was performed.    COMPARISON:  07/22/2025    FINDINGS:  The lungs are clear, with normal appearance of pulmonary vasculature and no pleural effusion or pneumothorax.    The cardiac silhouette is normal in size. The hilar and mediastinal contours are unremarkable.    Bones are intact.                                    Intake/Output - Last 3 Shifts      CT HEAD WITHOUT CONTRAST     CLINICAL HISTORY:  Altered mental status, nontraumatic (Ped 0-18y);     TECHNIQUE:  Low dose axial CT images obtained throughout the head without intravenous contrast. Sagittal and coronal reconstructions were performed.     COMPARISON:  None.     FINDINGS:  Intracranial compartment:     Ventricles and sulci are normal in size for age without evidence of hydrocephalus. No extra-axial blood or fluid collections.     Small hypodense area at the anterior margin of the left lateral basal ganglia could represent a small lacunar infarct age indeterminate, possibly recent.  MRI follow-up may better characterize.     No parenchymal mass, hemorrhage, edema or major vascular distribution infarct.     Skull/extracranial contents (limited evaluation): No fracture. Mastoid air cells and paranasal sinuses are essentially clear.     Impression:     Small hypodense area at the anterior margin of the left lateral basal ganglia could represent a small lacunar  infarct age indeterminate, possibly recent. MRI follow-up may better characterize.     This report was flagged in Epic as abnormal.        Electronically signed by:Rafael Castro  Date:                                            07/22/2025  Time:                                           17:53        Exam Ended: 07/22/25 17:17 CDT Last Resulted: 07/22/25 17:53 CDT         CT ABDOMEN PELVIS WITHOUT CONTRAST     CLINICAL HISTORY:  Abdominal pain, acute, nonlocalized;     TECHNIQUE:  Low dose axial images, sagittal and coronal reformations were obtained from the lung bases to the pubic symphysis.  30 mL of oral Omnipaque 350 was administered.     COMPARISON:  None     FINDINGS:  The liver, spleen, adrenal glands, kidneys and pancreas show an unremarkable non contrasted appearance.  The study is limited by streak artifact from the patient's arms being positioned at their side.  The gallbladder is in place.     There is no evidence bowel obstruction.  There is stool seen throughout the colon.  The appendix is not definitively seen however portions of the structure believed to represent the appendix within the right lower quadrant or within normal limits.     There is no evidence abdominal nor pelvic lymphadenopathy.  Reactive appearing lymph nodes are seen within both groins.  The osseous structures show likely bone islands within the left iliac bone..  There is no evidence abdominal fluid collection.     Impression:     There are no CT findings to specifically explain this patient's abdominal pain.  The study is limited by streak artifact from the patient's arms being positioned at their side.        Electronically signed by:Ambar Tucker MD  Date:                                            07/22/2025  Time:                                           16:42        Exam Ended: 07/22/25 16:27 CDT Last Resulted: 07/22/25 16:42 CDT                    07/24 0700 07/25 0659 07/25 0700 07/26 0659 07/26 0700 07/27 0659     IV Piggyback  2997 1000    Total Intake(mL/kg)  2997 (37.7) 1000 (12.6)    Net  +2997 +1000                 Microbiology Results (last 7 days)       ** No results found for the last 168 hours. **                Significant Imaging: I have reviewed all pertinent imaging results/findings within the past 24 hours.    Imaging Results              X-Ray Chest AP Portable (Final result)  Result time 07/25/25 19:35:30      Final result by Rafael Willoughby MD (07/25/25 19:35:30)                   Impression:      No acute abnormality.      Electronically signed by: Rafael Willoughby  Date:    07/25/2025  Time:    19:35               Narrative:    EXAMINATION:  XR CHEST AP PORTABLE    CLINICAL HISTORY:  Chest Pain;    TECHNIQUE:  Single frontal view of the chest was performed.    COMPARISON:  07/22/2025    FINDINGS:  The lungs are clear, with normal appearance of pulmonary vasculature and no pleural effusion or pneumothorax.    The cardiac silhouette is normal in size. The hilar and mediastinal contours are unremarkable.    Bones are intact.                                    Intake/Output - Last 3 Shifts         07/24 0700 07/25 0659 07/25 0700 07/26 0659 07/26 0700 07/27 0659    IV Piggyback  2997 1000    Total Intake(mL/kg)  2997 (37.7) 1000 (12.6)    Net  +2997 +1000                 Microbiology Results (last 7 days)       ** No results found for the last 168 hours. **            Assessment/Plan:     Decreased vision of left eye    Patient states this is been going on more than 1 week.  Can count fingers with left eye.  Extraocular motions are intact bilaterally.  Describes just blurry vision to left eye.  No eye pain    Failure to thrive in adult    Suspect this problems related to his HIV and noncompliance.  Patient was treated recent past for thrush although not obvious current.  States just does not have appetite.    Marijuana dependence    Smokes marijuana but states it has not used in over 2 weeks  Says he does  "this to help with appetite.  Denies any nausea and vomiting but told him that marijuana could be working in the opposite on him.    Noncompliance    Patient states he plans on being compliant and falling up.  States he would like help for his condition and feels he would do better    Unspecified psychosis not due to a substance or known physiological condition    States last hallucinations was a proximally 3 weeks earlier.  Describes them as seeing.  States a lot of people he sees or people that he knew that have since .  States sometimes they talk to him.  When asked if they were scary he states no they are "nice".  When seen at [facility] in  he was then noted to have suicidal thoughts.  States he is no longer suicidal and has no plans or thoughts of harming himself.  States he has safe in this regard.     Seizure disorder    Recurrent seizures with this seizure as etiology patient brought to the emergency department despite being that he has been on Keppra 500 mg twice a day    HIV (human immunodeficiency virus infection)    Followed at [facility] but noncompliant not seen in greater than a year.  Apparently off his HIV medication      VTE Risk Mitigation (From admission, onward)      None               Discussed with Dr. Obrien in the emergency department.  Feel patient would be best served by multiple disciplinary team approach that is not at this facility in house.  Would be best to be seen by team that includes ID, Neurology, GI, Neuro-Ophthalmology and psychiatry.  Although some of these services are available by tele - medicine at this facility feel would be best to have in-house Neurology with recurrent seizures especially.   Patient should have MRI to better categorize abnormal lesions noted on CT head.  Dr. Obrien to look in possibility of transfer to higher level care.  Hospitalist service will sign off unless recalled.             Thank you for your consult. I will sign off. Please contact us if you " have any additional questions.    Gadiel Osuna MD  Department of Hospital Medicine   [facility] - Emergency Department

## 2025-07-26 NOTE — PHARMACY MED REC
"Admission Medication History     The home medication history was taken by Kailey Duran.    You may go to "Admission" then "Reconcile Home Medications" tabs to review and/or act upon these items.     The home medication list has been updated by the Pharmacy department.   Please read ALL comments highlighted in yellow.   Please address this information as you see fit.    Feel free to contact us if you have any questions or require assistance.  I attempted to speak to the patient, but he was in no condition to speak to me. I reached out to his niece, who told me that the two medications he had were in a bag in his room, so I checked that. The medications were the recent prescriptions for Nystatin and Levetiracetam 500 mg. I was unable to ascertain if he had taken any of them.  Patient had previously mentioned on a visit to the ED on 7/22 that he hadn't been taking any of his medications, and there is no recent fill for Biktarvy in [facility].      Patient reports no longer taking the following medication(s). The medication(s) listed below were removed from the home medication list. Please reorder if appropriate:  Sulfamethoxazole-trimethoprim 800-160 mg    Medications listed below were obtained from: Patient/family, Medications brought from home, [facility]- [facility], and Medical records  (Not in a hospital admission)        Current Outpatient Medications on File Prior to Encounter   Medication Sig Dispense Refill Last Dose/Taking    BIKTARVY -25 mg (25 kg or greater) Take 1 tablet by mouth once daily.   Unknown    levETIRAcetam (KEPPRA) 500 MG Tab Take 1 tablet (500 mg total) by mouth 2 (two) times daily. 60 tablet 11 Taking    nystatin (MYCOSTATIN) 100,000 unit/mL suspension Take 5 mLs (500,000 Units total) by mouth 4 (four) times daily with meals and nightly. for 10 days 200 mL 0 Taking    [DISCONTINUED] sulfamethoxazole-trimethoprim 800-160mg (BACTRIM DS) 800-160 mg Tab Take 1 tablet by mouth once daily.   "          Potential issues to be addressed PRIOR TO DISCHARGE  Please discuss with the patient barriers to adherence with medication treatment plans    Kailey Duran  Medication Access Specialist  EXT. [id number]    .

## 2025-07-26 NOTE — SUBJECTIVE & OBJECTIVE
Past Medical History:   Diagnosis Date    Human immunodeficiency virus (HIV) disease        History reviewed. No pertinent surgical history.    Review of patient's allergies indicates:  No Known Allergies    No current facility-administered medications on file prior to encounter.     Current Outpatient Medications on File Prior to Encounter   Medication Sig    levETIRAcetam (KEPPRA) 500 MG Tab Take 1 tablet (500 mg total) by mouth 2 (two) times daily.    nystatin (MYCOSTATIN) 100,000 unit/mL suspension Take 5 mLs (500,000 Units total) by mouth 4 (four) times daily with meals and nightly. for 10 days    BIKTARVY -25 mg (25 kg or greater) Take 1 tablet by mouth once daily.    [DISCONTINUED] sulfamethoxazole-trimethoprim 800-160mg (BACTRIM DS) 800-160 mg Tab Take 1 tablet by mouth once daily.     Family History    None       Tobacco Use    Smoking status: Every Day     Types: Cigarettes    Smokeless tobacco: Never   Substance and Sexual Activity    Alcohol use: Not Currently    Drug use: Yes     Types: Marijuana    Sexual activity: Not on file     Review of Systems   Constitutional:  Positive for activity change, appetite change and fatigue. Negative for fever.   HENT:  Negative for congestion, hearing loss and trouble swallowing.    Eyes:  Positive for visual disturbance.   Respiratory:  Positive for cough. Negative for chest tightness, shortness of breath and wheezing.    Cardiovascular:  Negative for chest pain and palpitations.   Gastrointestinal:  Positive for constipation. Negative for abdominal pain and nausea.   Genitourinary:  Negative for difficulty urinating and dysuria.   Musculoskeletal:  Negative for back pain and neck stiffness.   Skin:  Negative for pallor and rash.   Neurological:  Negative for dizziness, speech difficulty and headaches.   Psychiatric/Behavioral:  Positive for hallucinations. Negative for confusion and suicidal ideas.      Objective:     Vital Signs (Most Recent):  Temp: 97.6 °F  (36.4 °C) (07/26/25 0705)  Pulse: 66 (07/26/25 1645)  Resp: 18 (07/26/25 0705)  BP: (!) 136/99 (07/26/25 1645)  SpO2: 96 % (07/26/25 1645) Vital Signs (24h Range):  Temp:  [97.6 °F (36.4 °C)-98 °F (36.7 °C)] 97.6 °F (36.4 °C)  Pulse:  [] 66  Resp:  [15-20] 18  SpO2:  [91 %-100 %] 96 %  BP: ()/(64-99) 136/99     Weight: 79.4 kg (175 lb)  Body mass index is 21.3 kg/m².     Physical Exam  Vitals reviewed.   Constitutional:       General: He is awake. He is not in acute distress.     Appearance: He is well-developed. He is not toxic-appearing.      Comments: Very thin   HENT:      Head: Normocephalic.      Nose: Nose normal.      Mouth/Throat:      Mouth: No oral lesions.      Pharynx: Oropharynx is clear.      Comments: No clear thrush  Eyes:      Extraocular Movements: Extraocular movements intact.      Right eye: Normal extraocular motion.      Left eye: Normal extraocular motion.      Pupils: Pupils are equal, round, and reactive to light.      Comments: Pupils 2 mm bilaterally and reactive, pupil seem slightly constricted bilaterally    Decreased vision in left eye without field cut, just states blurry vision in left eye but can counts fingers with that eye   Neck:      Thyroid: No thyroid mass.      Vascular: No carotid bruit.   Cardiovascular:      Rate and Rhythm: Normal rate and regular rhythm.      Pulses: Normal pulses.      Heart sounds: Normal heart sounds. No murmur heard.  Pulmonary:      Effort: Pulmonary effort is normal.      Breath sounds: Normal breath sounds and air entry. No wheezing.   Abdominal:      General: Bowel sounds are normal. There is no distension.      Palpations: Abdomen is soft.      Tenderness: There is no abdominal tenderness.   Musculoskeletal:         General: Normal range of motion.      Cervical back: Neck supple. No rigidity.   Skin:     General: Skin is warm.      Coloration: Skin is not jaundiced.      Findings: No lesion.   Neurological:      General: No focal  deficit present.      Mental Status: He is alert and oriented to person, place, and time.      Cranial Nerves: No cranial nerve deficit.   Psychiatric:         Attention and Perception: Attention normal.         Mood and Affect: Mood normal.         Behavior: Behavior normal. Behavior is cooperative.         Thought Content: Thought content normal.         Cognition and Memory: Cognition normal.          Significant Labs: All pertinent labs within the past 24 hours have been reviewed.  BMP:   Recent Labs   Lab 07/25/25 1922   *   *   K 3.7   CL 96*   CO2 26   BUN 26*   CREATININE 0.94   CALCIUM 8.7   MG 2.4     CBC:   Recent Labs   Lab 07/25/25 1922   WBC 6.74   HGB 12.3*   HCT 38.0*        CMP:   Recent Labs   Lab 07/25/25 1922   *   K 3.7   CL 96*   CO2 26   *   BUN 26*   CREATININE 0.94   CALCIUM 8.7   PROT 7.6   ALBUMIN 3.2*   BILITOT 0.9   ALKPHOS 64   AST 13   ALT 8   ANIONGAP 15       Imaging Results              X-Ray Chest AP Portable (Final result)  Result time 07/25/25 19:35:30      Final result by Rafael Willoughby MD (07/25/25 19:35:30)                   Impression:      No acute abnormality.      Electronically signed by: Rafael Willoughby  Date:    07/25/2025  Time:    19:35               Narrative:    EXAMINATION:  XR CHEST AP PORTABLE    CLINICAL HISTORY:  Chest Pain;    TECHNIQUE:  Single frontal view of the chest was performed.    COMPARISON:  07/22/2025    FINDINGS:  The lungs are clear, with normal appearance of pulmonary vasculature and no pleural effusion or pneumothorax.    The cardiac silhouette is normal in size. The hilar and mediastinal contours are unremarkable.    Bones are intact.                                    Intake/Output - Last 3 Shifts         07/24 0700 07/25 0659 07/25 0700 07/26 0659 07/26 0700 07/27 0659    IV Piggyback  2997 1000    Total Intake(mL/kg)  2997 (37.7) 1000 (12.6)    Net  +2997 +1000                 Microbiology Results  (last 7 days)       ** No results found for the last 168 hours. **                Significant Imaging: I have reviewed all pertinent imaging results/findings within the past 24 hours.    Imaging Results              X-Ray Chest AP Portable (Final result)  Result time 07/25/25 19:35:30      Final result by Rafael Willoughby MD (07/25/25 19:35:30)                   Impression:      No acute abnormality.      Electronically signed by: Rafael Willoughby  Date:    07/25/2025  Time:    19:35               Narrative:    EXAMINATION:  XR CHEST AP PORTABLE    CLINICAL HISTORY:  Chest Pain;    TECHNIQUE:  Single frontal view of the chest was performed.    COMPARISON:  07/22/2025    FINDINGS:  The lungs are clear, with normal appearance of pulmonary vasculature and no pleural effusion or pneumothorax.    The cardiac silhouette is normal in size. The hilar and mediastinal contours are unremarkable.    Bones are intact.                                    Intake/Output - Last 3 Shifts         07/24 0700 07/25 0659 07/25 0700 07/26 0659 07/26 0700 07/27 0659    IV Piggyback  2997 1000    Total Intake(mL/kg)  2997 (37.7) 1000 (12.6)    Net  +2997 +1000                 Microbiology Results (last 7 days)       ** No results found for the last 168 hours. **

## 2025-07-26 NOTE — HPI
"  Thank you for consult    Chief complaint:  Can not eat    History of present:        Mr. Mccall is a 40-year-old admitted prior day to the emergency room after having witnessed seizure at home lasting approximately 2 minutes.  Patient states he must have passed out and does not remember.  States he has had several falls reasons in told that they were seizure.  He is not aware of circumstances in that he states he just "falls out".  States he has had urinary incontinence with these episodes but no tongue biting.  Gets occasionally headaches but not severe and not bothering him recently.  No fever or other recent illness.  He had been in the emergency department on 07/22 after his family found him unresponsive and had hard time waking him up.  He had told providers at that time he must have taken a nap.  Patient does not remember details of that episode currently.  Patient has been taking Keppra.  He does not remember when that was started or circumstance.  Feels these episodes of falling out that he has been told were seizure started this year.  Has not seen anyone outside the emergency department concerning these.  Patient was being evaluated here with decision to discharge him home with outpatient follow up.  Going to the car had a near syncopal episode and was brought back into the emergency department.  He states that episode was somewhat different although he can not remember the whole episode.  Nursing staff did not witness any seizure with that episode nor any postictal period.       Patient states has had several months of "not been able to eat".  Was treated in December 2024 4 thrush and given nystatin.  This time denies any sore mouth or dysphagia.  That is just does not have any appetite.  He feels he has lost weight but not sure how much.  Tells me he weighed 175 lb 3 months ago in that is his current listed weih ( will have confirmed).  No abdominal pain.  No GI upset.  Last bowel movement prior day " and but he states he is not eating enough to have bowel movements.       Patient has history of some point over 3 years of HIV.  He had been followed at [facility].  States proximally year ago he stopped all those medicines and quit gone.  There has numerous listed call in records of appointments made and patient not showing up.   is currently taking 2 home medicines.  Only thing confirmed was nystatin swish and swallow and Keppra 500 mg twice daily.  He has been off Septra.  Had listed Biktarvy  -25 daily but could not be confirm that he had had filled plus patient told me he stopped all his HIV drugs more than a year ago.         Hospitalist service was asked to evaluate patient medically.    Review of systems:  See above.  Patient's poor historian and difficult to get specifics from him.  Has some thick speech that is also difficult to understand.  Occasional headaches but not recent.  States in the last week has had Mik decreased vision to left eye.  No diplopia.  No prior similar problems.  Last bowel movement prior day that was small and hard.  States urination has been good.  As stated denies sore mouth or dysphagia that would suggest active yeast infection.  States can ambulate just weak.  History of hallucinations.  States last hallucinations proximally 3 weeks earlier.  Describes them has visual hallucinations generally but can hear people talking at times.  States has been people that he knows had  that he can see.  States it is not scary.  Denies any suicidal thoughts or plans currently.  Review of systems otherwise negative.    Past medical history:  -HIV diagnosed more than 3 years ago  -unspecified psychosis  -seizure disorder    Past surgical history  None    No known medication allergies    Social history:  Lives with sister   it has not been sexually active in more than 1 year  Had some homosexual exposure he states 5 or 6 years ago  No history of tobacco or alcohol  use  States only uses marijuana because he feels it help him with his appetite  Last marijuana he states he has been over 2 weeks prior to admission    Family history:  No one in family premature coronary artery disease    Health maintenance issues:  No primary care provider, states it has not seen doctor in over a year accept through the emergency department

## 2025-07-26 NOTE — ASSESSMENT & PLAN NOTE
Recurrent seizures with this seizure as etiology patient brought to the emergency department despite being that he has been on Keppra 500 mg twice a day

## 2025-07-26 NOTE — ASSESSMENT & PLAN NOTE
Patient states this is been going on more than 1 week.  Can count fingers with left eye.  Extraocular motions are intact bilaterally.  Describes just blurry vision to left eye.  No eye pain

## 2025-07-26 NOTE — ASSESSMENT & PLAN NOTE
Patient states he plans on being compliant and falling up.  States he would like help for his condition and feels he would do better

## 2025-07-26 NOTE — ED NOTES
Call from [facility] could not take patient at this time, will continue to seek placement.

## 2025-07-26 NOTE — ASSESSMENT & PLAN NOTE
Followed at [facility] but noncompliant not seen in greater than a year.  Apparently off his HIV medication

## 2025-07-28 LAB
OHS QRS DURATION: 74 MS
OHS QTC CALCULATION: 406 MS